# Patient Record
Sex: MALE | Race: WHITE
[De-identification: names, ages, dates, MRNs, and addresses within clinical notes are randomized per-mention and may not be internally consistent; named-entity substitution may affect disease eponyms.]

---

## 2021-03-15 ENCOUNTER — HOSPITAL ENCOUNTER (OUTPATIENT)
Dept: HOSPITAL 75 - RAD FS | Age: 36
End: 2021-03-15
Attending: NURSE PRACTITIONER
Payer: COMMERCIAL

## 2021-03-15 DIAGNOSIS — M25.561: Primary | ICD-10-CM

## 2021-03-15 PROCEDURE — 73562 X-RAY EXAM OF KNEE 3: CPT

## 2021-03-15 NOTE — DIAGNOSTIC IMAGING REPORT
INDICATION: Knee pain.



3 views were obtained.



FINDINGS: The alignment is normal. No fracture or dislocation.

Soft tissues are unremarkable. No joint effusion.



IMPRESSION: No focal abnormality of the right knee.



Dictated by: 



  Dictated on workstation # [id number]